# Patient Record
Sex: FEMALE | Race: WHITE | URBAN - METROPOLITAN AREA
[De-identification: names, ages, dates, MRNs, and addresses within clinical notes are randomized per-mention and may not be internally consistent; named-entity substitution may affect disease eponyms.]

---

## 2017-04-18 LAB
BUN SERPL-MCNC: 9 MG/DL (ref 7–18)
CREAT SERPL-MCNC: 0.71 MG/DL (ref 0.6–1.3)
GLUCOSE SERPL-MCNC: 76 MG/DL (ref 74–106)

## 2017-12-18 ENCOUNTER — HISTORICAL (OUTPATIENT)
Dept: LAB | Facility: HOSPITAL | Age: 32
End: 2017-12-18

## 2017-12-18 LAB
ABS NEUT (OLG): 5.93 X10(3)/MCL (ref 2.1–9.2)
B-HCG SERPL QL: NEGATIVE
BASOPHILS # BLD AUTO: 0 X10(3)/MCL (ref 0–0.2)
BASOPHILS NFR BLD AUTO: 0 %
EOSINOPHIL # BLD AUTO: 0.1 X10(3)/MCL (ref 0–0.9)
EOSINOPHIL NFR BLD AUTO: 1 %
ERYTHROCYTE [DISTWIDTH] IN BLOOD BY AUTOMATED COUNT: 11.8 % (ref 11.5–17)
HCT VFR BLD AUTO: 36.9 % (ref 37–47)
HGB BLD-MCNC: 12.3 GM/DL (ref 12–16)
LYMPHOCYTES # BLD AUTO: 2.2 X10(3)/MCL (ref 0.6–4.6)
LYMPHOCYTES NFR BLD AUTO: 24 %
MCH RBC QN AUTO: 30.6 PG (ref 27–31)
MCHC RBC AUTO-ENTMCNC: 33.3 GM/DL (ref 33–36)
MCV RBC AUTO: 91.8 FL (ref 80–94)
MONOCYTES # BLD AUTO: 0.6 X10(3)/MCL (ref 0.1–1.3)
MONOCYTES NFR BLD AUTO: 7 %
NEUTROPHILS # BLD AUTO: 5.93 X10(3)/MCL (ref 2.1–9.2)
NEUTROPHILS NFR BLD AUTO: 67 %
PLATELET # BLD AUTO: 242 X10(3)/MCL (ref 130–400)
PMV BLD AUTO: 10.4 FL (ref 9.4–12.4)
RBC # BLD AUTO: 4.02 X10(6)/MCL (ref 4.2–5.4)
WBC # SPEC AUTO: 8.8 X10(3)/MCL (ref 4.5–11.5)

## 2017-12-20 ENCOUNTER — HISTORICAL (OUTPATIENT)
Dept: ADMINISTRATIVE | Facility: HOSPITAL | Age: 32
End: 2017-12-20

## 2019-09-13 ENCOUNTER — HISTORICAL (OUTPATIENT)
Dept: LAB | Facility: HOSPITAL | Age: 34
End: 2019-09-13

## 2019-09-13 LAB — TSH SERPL-ACNC: 0.8 MIU/ML (ref 0.36–3.74)

## 2020-07-08 ENCOUNTER — HOSPITAL ENCOUNTER (OUTPATIENT)
Dept: MEDSURG UNIT | Facility: HOSPITAL | Age: 35
End: 2020-07-09
Attending: OBSTETRICS & GYNECOLOGY | Admitting: OBSTETRICS & GYNECOLOGY

## 2022-04-10 ENCOUNTER — HISTORICAL (OUTPATIENT)
Dept: ADMINISTRATIVE | Facility: HOSPITAL | Age: 37
End: 2022-04-10

## 2022-04-29 VITALS
SYSTOLIC BLOOD PRESSURE: 107 MMHG | WEIGHT: 154.75 LBS | BODY MASS INDEX: 30.38 KG/M2 | HEIGHT: 60 IN | DIASTOLIC BLOOD PRESSURE: 69 MMHG | OXYGEN SATURATION: 99 %

## 2022-04-29 NOTE — OP NOTE
DATE OF SURGERY:    07/07/2020    SURGEON:  Blake Mo MD    OPERATION PERFORMED:    1. Total laparoscopic hysterectomy.  2. Bilateral salpingo-oophorectomy.    POSTOPERATIVE DIAGNOSES:  The patient is a 34-year-old, white female with menorrhagia and also ovarian cyst, unresponsive to conservative measures.    POSTOPERATIVE DIAGNOSIS:  The patient is a 34-year-old, white female with menorrhagia and also ovarian cyst, unresponsive to conservative measures.    PROCEDURE IN DETAIL:  After proper consents were obtained, the patient was brought to the operating room, placed in the supine position, and underwent general anesthesia and intubation without difficulty.  She was then placed in dorsal lithotomy position, prepped and draped in sterile fashion. The SANDRA manipulator was placed into the cervical canal.  A small incision was then made above the umbilicus and the Veress needle was placed into the abdominal cavity.  Two liters of CO2 were allowed to enter the abdominal cavity.  Trocars were then introduced without difficulty.  The da Alyssa robotic system was then put into place and docked.  Contents of the pelvic cavity examined and the uterus was found to be normal size, shape and position.  Both ovaries had numerous cysts on their surfaces.  At this point in time, the round ligaments were identified bilaterally, clamped, cauterized and cut.  The infundibulopelvic ligaments were isolated, clamped, cauterized and cut.  The bladder was mobilized below the cervix, thus exposing the uterine arteries.  The uterine arteries were then clamped, cauterized and cut bilaterally.  An anterior and posterior colpotomy incision was then made and the ring was demonstrated.  The tissue at the 3 o'clock and 9 o'clock positions were then dissected down to the ring, thus completing the colpotomy.  The specimen was removed transvaginally.  The vaginal cuff was closed with a 2-0 Stratafix suture.  All pedicles were  checked and found to be absolutely dry.  There was no evidence of any visceral injuries.  At this point in time, the CO2 was allowed to escape from abdominal cavity.  Trocars removed without difficulty.  The small incision was closed with 3-0 Vicryl subcuticular stitch.  The patient was carefully     brought out of dorsal lithotomy position and brought to the recovery room in stable fashion tolerating the procedure well.        ______________________________  Blake Mo MD    [facility] [place]/  DD:  07/09/2020  Time:  08:43AM  DT:  07/09/2020  Time:  09:12AM  Job #:  [id number]

## 2022-04-29 NOTE — OP NOTE
DATE OF SURGERY:    12/20/2017    SURGEON:  Blake Mo MD    PROCEDURE:  Dilation and curettage, NovaSure endometrial ablation, laparoscopic tubal cauterization.    PREOPERATIVE DIAGNOSIS:  This is a 32 year old white female with menorrhagia unresponsive to conservative measures.    POSTOPERATIVE DIAGNOSIS:  This is a 32 year old white female with menorrhagia unresponsive to conservative measures.    PROCEDURE IN DETAIL:  After the proper consents were obtained, the patient was brought to the OR, and placed in the supine position, and she underwent general anesthesia intubation without difficulty.  She was then placed in the dorsal lithotomy position, prepped and draped in a sterile fashion.  The speculum was placed in the vaginal vault.  The anterior lip of the cervix was grasped with a single tooth tenaculum.  The uterus was sounded to 9 cm.  The cervix was dilated to a size #8 Hegar.  A sharp curettage was then performed and curettage was continued until there was a gritty consistency felt throughout the cavity.  The tissue was sent to pathology.  The next part of the procedure involved the endometrial ablation which was accomplished with the NovaSure apparatus.  No system malfunctions or complications were encountered.  A small incision was then made above the umbilicus and the Veress needle was placed into the abdominal cavity.  2 liters of CO2 were allowed to enter the abdominal cavity.  Trocars were then introduced without difficulty.  The contents of the pelvic cavity were examined.  The uterus was normal size, shape, and position.  Both adnexa were within normal limits.  The right fallopian tube was identified and traced to the fimbriated end.  It was then cauterized in three adjacent spots.  A like procedure was done on the opposite fallopian tube.  There was no evidence of any bleeding or any visceral injuries.  No evidence of any pelvic pathology.  The CO2 was allowed to escape from the  abdominal cavity.  The trocar was removed without difficulty.  The small incision was closed with a 3-0 Vicryl subcuticular stitch.  The patient was carefully brought out of the dorsal lithotomy position and brought to the Recovery Room in a stable fashion tolerating the procedure well.        ______________________________  Blake Mo MD CEP/BEN  DD:  12/20/2017  Time:  02:56PM  DT:  12/21/2017  Time:  06:41AM  Job #:  [id number]

## 2022-04-29 NOTE — DISCHARGE SUMMARY
DISCHARGE DATE:  07/08/2020    CHIEF COMPLAINT/PERTINENT HISTORY:  Patient is a 34-year-old, white female who was admitted for a TLH.  For pertinent physical findings on admit, please refer to my initial history and physical done on admit.    HOSPITAL COURSE AND TREATMENT:  The patient underwent a TLH, tolerating the procedure well.  Her postoperative course has been essentially unremarkable.    FINAL DIAGNOSIS:  Pelvic pain.    CONDITION ON DISCHARGE:  Subjectively, the patient is without complaints, tolerating a regular diet, ambulating without assistance.  Objectively, her vital signs are stable.  Physical exam essentially normal.    DISPOSITION:  Activity is pelvic rest.  Diet is regular.  Medications are Percocet.  Follow up in my office in 2 weeks for postop check up.        ______________________________  Blake Mo MD    [facility] [place]/  DD:  07/09/2020  Time:  08:45AM  DT:  07/09/2020  Time:  09:03AM  Job #:  [id number]

## 2022-09-17 ENCOUNTER — HISTORICAL (OUTPATIENT)
Dept: ADMINISTRATIVE | Facility: HOSPITAL | Age: 37
End: 2022-09-17

## 2024-04-15 ENCOUNTER — HOSPITAL ENCOUNTER (EMERGENCY)
Facility: HOSPITAL | Age: 39
Discharge: HOME | End: 2024-04-15
Attending: INTERNAL MEDICINE
Payer: COMMERCIAL

## 2024-04-15 VITALS
OXYGEN SATURATION: 97 % | SYSTOLIC BLOOD PRESSURE: 114 MMHG | WEIGHT: 194.38 LBS | TEMPERATURE: 98 F | BODY MASS INDEX: 37.97 KG/M2 | HEART RATE: 72 BPM | RESPIRATION RATE: 18 BRPM | DIASTOLIC BLOOD PRESSURE: 97 MMHG

## 2024-04-15 DIAGNOSIS — R10.13 EPIGASTRIC ABDOMINAL PAIN: ICD-10-CM

## 2024-04-15 DIAGNOSIS — K59.00 CONSTIPATION, UNSPECIFIED CONSTIPATION TYPE: Primary | ICD-10-CM

## 2024-04-15 LAB
ALBUMIN SERPL-MCNC: 3.4 G/DL (ref 3.5–5)
ALBUMIN/GLOB SERPL: 0.8 RATIO (ref 1.1–2)
ALP SERPL-CCNC: 76 UNIT/L (ref 40–150)
ALT SERPL-CCNC: 11 UNIT/L (ref 0–55)
AMYLASE SERPL-CCNC: 41 UNIT/L (ref 25–125)
APPEARANCE UR: CLEAR
AST SERPL-CCNC: 14 UNIT/L (ref 5–34)
BACTERIA #/AREA URNS AUTO: ABNORMAL /HPF
BASOPHILS # BLD AUTO: 0.01 X10(3)/MCL
BASOPHILS NFR BLD AUTO: 0.1 %
BILIRUB SERPL-MCNC: 0.2 MG/DL
BILIRUB UR QL STRIP.AUTO: NEGATIVE
BUN SERPL-MCNC: 14 MG/DL (ref 7–18.7)
CALCIUM SERPL-MCNC: 9.3 MG/DL (ref 8.4–10.2)
CHLORIDE SERPL-SCNC: 106 MMOL/L (ref 98–107)
CO2 SERPL-SCNC: 23 MMOL/L (ref 22–29)
COLOR UR AUTO: YELLOW
CREAT SERPL-MCNC: 0.79 MG/DL (ref 0.55–1.02)
EOSINOPHIL # BLD AUTO: 0.16 X10(3)/MCL (ref 0–0.9)
EOSINOPHIL NFR BLD AUTO: 1.6 %
ERYTHROCYTE [DISTWIDTH] IN BLOOD BY AUTOMATED COUNT: 11.9 % (ref 11.5–17)
GFR SERPLBLD CREATININE-BSD FMLA CKD-EPI: >60 MLS/MIN/1.73/M2
GLOBULIN SER-MCNC: 4.2 GM/DL (ref 2.4–3.5)
GLUCOSE SERPL-MCNC: 98 MG/DL (ref 74–100)
GLUCOSE UR QL STRIP.AUTO: NEGATIVE
HCT VFR BLD AUTO: 37.8 % (ref 37–47)
HGB BLD-MCNC: 12.4 G/DL (ref 12–16)
IMM GRANULOCYTES # BLD AUTO: 0.01 X10(3)/MCL (ref 0–0.04)
IMM GRANULOCYTES NFR BLD AUTO: 0.1 %
KETONES UR QL STRIP.AUTO: NEGATIVE
LACTATE SERPL-SCNC: 1.4 MMOL/L (ref 0.5–2.2)
LEUKOCYTE ESTERASE UR QL STRIP.AUTO: NEGATIVE
LIPASE SERPL-CCNC: 30 U/L
LYMPHOCYTES # BLD AUTO: 2.18 X10(3)/MCL (ref 0.6–4.6)
LYMPHOCYTES NFR BLD AUTO: 21.7 %
MCH RBC QN AUTO: 30.2 PG (ref 27–31)
MCHC RBC AUTO-ENTMCNC: 32.8 G/DL (ref 33–36)
MCV RBC AUTO: 92 FL (ref 80–94)
MONOCYTES # BLD AUTO: 0.48 X10(3)/MCL (ref 0.1–1.3)
MONOCYTES NFR BLD AUTO: 4.8 %
NEUTROPHILS # BLD AUTO: 7.2 X10(3)/MCL (ref 2.1–9.2)
NEUTROPHILS NFR BLD AUTO: 71.7 %
NITRITE UR QL STRIP.AUTO: NEGATIVE
PH UR STRIP.AUTO: 8.5 [PH]
PLATELET # BLD AUTO: 273 X10(3)/MCL (ref 130–400)
PMV BLD AUTO: 9.8 FL (ref 7.4–10.4)
POTASSIUM SERPL-SCNC: 3.7 MMOL/L (ref 3.5–5.1)
PROT SERPL-MCNC: 7.6 GM/DL (ref 6.4–8.3)
PROT UR QL STRIP.AUTO: NEGATIVE
RBC # BLD AUTO: 4.11 X10(6)/MCL (ref 4.2–5.4)
RBC #/AREA URNS AUTO: ABNORMAL /HPF
RBC UR QL AUTO: ABNORMAL
SODIUM SERPL-SCNC: 140 MMOL/L (ref 136–145)
SP GR UR STRIP.AUTO: 1.01 (ref 1–1.03)
SQUAMOUS #/AREA URNS AUTO: ABNORMAL /HPF
UROBILINOGEN UR STRIP-ACNC: 0.2
WBC # SPEC AUTO: 10.04 X10(3)/MCL (ref 4.5–11.5)
WBC #/AREA URNS AUTO: ABNORMAL /HPF

## 2024-04-15 PROCEDURE — 25000003 PHARM REV CODE 250: Performed by: INTERNAL MEDICINE

## 2024-04-15 PROCEDURE — 83605 ASSAY OF LACTIC ACID: CPT | Performed by: INTERNAL MEDICINE

## 2024-04-15 PROCEDURE — 82150 ASSAY OF AMYLASE: CPT | Performed by: INTERNAL MEDICINE

## 2024-04-15 PROCEDURE — 63600175 PHARM REV CODE 636 W HCPCS: Performed by: INTERNAL MEDICINE

## 2024-04-15 PROCEDURE — 85025 COMPLETE CBC W/AUTO DIFF WBC: CPT | Performed by: INTERNAL MEDICINE

## 2024-04-15 PROCEDURE — 96374 THER/PROPH/DIAG INJ IV PUSH: CPT

## 2024-04-15 PROCEDURE — 96361 HYDRATE IV INFUSION ADD-ON: CPT

## 2024-04-15 PROCEDURE — 83690 ASSAY OF LIPASE: CPT | Performed by: INTERNAL MEDICINE

## 2024-04-15 PROCEDURE — 99284 EMERGENCY DEPT VISIT MOD MDM: CPT | Mod: 25

## 2024-04-15 PROCEDURE — 80053 COMPREHEN METABOLIC PANEL: CPT | Performed by: INTERNAL MEDICINE

## 2024-04-15 PROCEDURE — 81001 URINALYSIS AUTO W/SCOPE: CPT | Performed by: INTERNAL MEDICINE

## 2024-04-15 RX ORDER — PROCHLORPERAZINE EDISYLATE 5 MG/ML
10 INJECTION INTRAMUSCULAR; INTRAVENOUS
Status: COMPLETED | OUTPATIENT
Start: 2024-04-15 | End: 2024-04-15

## 2024-04-15 RX ORDER — LACTULOSE 10 G/15ML
20 SOLUTION ORAL
Status: COMPLETED | OUTPATIENT
Start: 2024-04-15 | End: 2024-04-15

## 2024-04-15 RX ORDER — LACTULOSE 10 G/15ML
20 SOLUTION ORAL 3 TIMES DAILY
Qty: 450 ML | Refills: 0 | Status: SHIPPED | OUTPATIENT
Start: 2024-04-15 | End: 2024-04-20

## 2024-04-15 RX ADMIN — LACTULOSE 20 G: 20 SOLUTION ORAL at 04:04

## 2024-04-15 RX ADMIN — PROCHLORPERAZINE EDISYLATE 10 MG: 5 INJECTION INTRAMUSCULAR; INTRAVENOUS at 03:04

## 2024-04-15 RX ADMIN — SODIUM CHLORIDE 1000 ML: 9 INJECTION, SOLUTION INTRAVENOUS at 03:04

## 2024-04-15 NOTE — ED PROVIDER NOTES
Encounter Date: 4/15/2024       History     Chief Complaint   Patient presents with    Abdominal Pain     Began at 1800 yesterday with upper abd and mid back pain. Currently on steroid for URI     38-year-old white female with midepigastric abdominal pain that began at 6:00 p.m. yesterday and now is not letting her sleep and stabbing her in the mid back.  She denies fever chills and denies vomiting but is very nauseated and made herself throw up but it did not give her any relief      Review of patient's allergies indicates:   Allergen Reactions    Coconut oil Anaphylaxis     sob,rash,swelling    Peanut Itching     No past medical history on file.  Past Surgical History:   Procedure Laterality Date    CHOLECYSTECTOMY      HYSTERECTOMY      TONSILLECTOMY       No family history on file.  Social History     Tobacco Use    Smoking status: Every Day     Types: Vaping with nicotine     Review of Systems   Constitutional: Negative.  Negative for activity change, appetite change, chills, diaphoresis, fatigue, fever and unexpected weight change.   HENT: Negative.  Negative for congestion, dental problem, drooling, ear discharge, ear pain, facial swelling, hearing loss, mouth sores, nosebleeds, postnasal drip, rhinorrhea, sinus pressure, sinus pain, sneezing, sore throat, tinnitus, trouble swallowing and voice change.    Eyes: Negative.  Negative for photophobia, pain, discharge, redness, itching and visual disturbance.   Respiratory: Negative.  Negative for apnea, cough, choking, chest tightness, shortness of breath, wheezing and stridor.    Cardiovascular: Negative.  Negative for chest pain, palpitations and leg swelling.   Gastrointestinal:  Positive for abdominal pain and nausea. Negative for abdominal distention, anal bleeding, blood in stool, constipation, diarrhea, rectal pain and vomiting.   Endocrine: Negative.  Negative for cold intolerance, heat intolerance, polydipsia, polyphagia and polyuria.   Genitourinary:  Negative.  Negative for decreased urine volume, difficulty urinating, dyspareunia, dysuria, enuresis, flank pain, frequency, genital sores, hematuria, menstrual problem, pelvic pain, urgency, vaginal bleeding, vaginal discharge and vaginal pain.   Musculoskeletal: Negative.  Negative for arthralgias, back pain, gait problem, joint swelling, myalgias, neck pain and neck stiffness.   Skin: Negative.  Negative for color change, pallor, rash and wound.   Allergic/Immunologic: Negative.  Negative for environmental allergies, food allergies and immunocompromised state.   Neurological: Negative.  Negative for dizziness, tremors, seizures, syncope, facial asymmetry, speech difficulty, weakness, light-headedness, numbness and headaches.   Hematological: Negative.  Negative for adenopathy. Does not bruise/bleed easily.   Psychiatric/Behavioral: Negative.  Negative for agitation, behavioral problems, confusion, decreased concentration, dysphoric mood, hallucinations, self-injury, sleep disturbance and suicidal ideas. The patient is not nervous/anxious and is not hyperactive.    All other systems reviewed and are negative.      Physical Exam     Initial Vitals [04/15/24 0251]   BP Pulse Resp Temp SpO2   (!) 160/117 83 18 98.2 °F (36.8 °C) 99 %      MAP       --         Physical Exam    Nursing note and vitals reviewed.  Constitutional: She appears well-developed and well-nourished. She is not diaphoretic. No distress.   HENT:   Head: Normocephalic and atraumatic.   Mouth/Throat: Oropharynx is clear and moist. No oropharyngeal exudate.   Eyes: Conjunctivae and EOM are normal. Pupils are equal, round, and reactive to light. No scleral icterus.   Neck: Neck supple. No JVD present.   Normal range of motion.  Cardiovascular:  Normal rate, regular rhythm, normal heart sounds and intact distal pulses.     Exam reveals no gallop and no friction rub.       No murmur heard.  Pulmonary/Chest: Breath sounds normal. No respiratory distress.  She has no wheezes. She exhibits no tenderness.   Abdominal: Abdomen is soft. Bowel sounds are normal. She exhibits no distension. There is abdominal tenderness in the epigastric area.     There is no rebound.   Musculoskeletal:         General: Normal range of motion.      Cervical back: Normal range of motion and neck supple.     Neurological: She is alert and oriented to person, place, and time. She has normal strength.   Skin: Skin is warm and dry. Capillary refill takes less than 2 seconds.   Psychiatric: She has a normal mood and affect. Her behavior is normal. Judgment and thought content normal.         ED Course   Procedures  Labs Reviewed   COMPREHENSIVE METABOLIC PANEL - Abnormal; Notable for the following components:       Result Value    Albumin Level 3.4 (*)     Globulin 4.2 (*)     Albumin/Globulin Ratio 0.8 (*)     All other components within normal limits   URINALYSIS, REFLEX TO URINE CULTURE - Abnormal; Notable for the following components:    Blood, UA 1+ (*)     All other components within normal limits   CBC WITH DIFFERENTIAL - Abnormal; Notable for the following components:    RBC 4.11 (*)     MCHC 32.8 (*)     All other components within normal limits   URINALYSIS, MICROSCOPIC - Abnormal; Notable for the following components:    Bacteria, UA Few (*)     Squamous Epithelial Cells, UA Moderate (*)     All other components within normal limits   LACTIC ACID, PLASMA - Normal   AMYLASE - Normal   LIPASE - Normal   CBC W/ AUTO DIFFERENTIAL    Narrative:     The following orders were created for panel order CBC auto differential.  Procedure                               Abnormality         Status                     ---------                               -----------         ------                     CBC with Differential[[id number]]       Abnormal            Final result                 Please view results for these tests on the individual orders.          Imaging Results              X-Ray Abdomen  Flat And Erect (Preliminary result)  Result time 04/15/24 04:37:46      Wet Read by Asher Best MD (04/15/24 04:37:46, [facility] - Emergency Dept, Emergency Medicine)    Stool and gas noted throughout the entire colon consistent with moderate constipation without evidence of perforation or obstruction noted                                     Medications   lactulose 20 gram/30 mL solution Soln 20 g (has no administration in time range)   sodium chloride 0.9% bolus 1,000 mL 1,000 mL (1,000 mLs Intravenous New Bag 4/15/24 0332)   prochlorperazine injection Soln 10 mg (10 mg Intravenous Given 4/15/24 0330)     Medical Decision Making  38-year-old white female presents with stabbing midepigastric abdominal pain that radiates to her back.  Differential diagnosis included pancreatitis, pneumobilia, gastroenteritis, gastritis, gastric ulcer, perforated bowel, diverticulitis, anxiety, constipation.  Workup included x-ray and blood work and urine.  Blood work is relatively benign and x-ray shows a significant amount of stool burden consistent with moderate constipation.  She was given some lactulose for this and for nausea she was given Compazine which greatly relaxer and reduced her pain has pretty much 0.  I discussed the findings with her and her mother and will send in some lactulose for to take for the next few days with copious amounts of water until she cleans her bowel out and recommend high-fiber diet and daily stool softeners    Problems Addressed:  Constipation, unspecified constipation type: acute illness or injury with systemic symptoms  Epigastric abdominal pain: acute illness or injury with systemic symptoms    Amount and/or Complexity of Data Reviewed  Independent Historian: parent  External Data Reviewed: notes.  Labs: ordered. Decision-making details documented in ED Course.  Radiology: ordered and independent interpretation performed. Decision-making details documented in ED  Course.    Risk  OTC drugs.  Prescription drug management.                                      Clinical Impression:  Final diagnoses:  [K59.00] Constipation, unspecified constipation type (Primary)  [R10.13] Epigastric abdominal pain          ED Disposition Condition    Discharge Stable          ED Prescriptions       Medication Sig Dispense Start Date End Date Auth. Provider    lactulose 10 gram/15 ml (CHRONULAC) 10 gram/15 mL (15 mL) solution Take 30 mLs (20 g total) by mouth 3 (three) times daily. Take with plenty of water until loose bowel movement for 5 days 450 mL 4/15/2024 4/20/2024 Asher Best MD          Follow-up Information       Follow up With Specialties Details Why Contact Info    Greg Ford MD Internal Medicine In 3 days  [address]  [address]  [facility] [employer], [place] [address]  [phone]            Christine Seth is a certified MA and was present during the entire interaction with this patient      Asher Best MD  04/15/24 6430

## 2024-04-15 NOTE — Clinical Note
"Nena "PREMA Max was seen and treated in our emergency department on 4/15/2024.     COVID-19 is present in our communities across the state. There is limited testing for COVID at this time, so not all patients can be tested. In this situation, your employee meets the following criteria:    Nena Max has met the criteria for COVID-19 testing based upon symptoms, travel, and/or potential exposure. The test has been completed and is pending results at this time. During this time the employee is not able to work and should be quarantined per the Centers for Disease Control timelines.     If you have any questions or concerns, or if I can be of further assistance, please do not hesitate to contact me.    Sincerely,             Asher Best MD"

## 2025-01-15 ENCOUNTER — HOSPITAL ENCOUNTER (EMERGENCY)
Facility: HOSPITAL | Age: 40
Discharge: HOME | End: 2025-01-15
Attending: INTERNAL MEDICINE
Payer: COMMERCIAL

## 2025-01-15 VITALS
WEIGHT: 190 LBS | DIASTOLIC BLOOD PRESSURE: 99 MMHG | OXYGEN SATURATION: 100 % | HEART RATE: 80 BPM | SYSTOLIC BLOOD PRESSURE: 134 MMHG | RESPIRATION RATE: 16 BRPM | HEIGHT: 60 IN | BODY MASS INDEX: 37.3 KG/M2 | TEMPERATURE: 98 F

## 2025-01-15 DIAGNOSIS — S61.411A LACERATION OF RIGHT HAND WITHOUT FOREIGN BODY, INITIAL ENCOUNTER: Primary | ICD-10-CM

## 2025-01-15 PROCEDURE — 99282 EMERGENCY DEPT VISIT SF MDM: CPT | Mod: 25

## 2025-01-15 PROCEDURE — 12001 RPR S/N/AX/GEN/TRNK 2.5CM/<: CPT

## 2025-01-16 NOTE — ED PROVIDER NOTES
Encounter Date: 1/15/2025       History     Chief Complaint   Patient presents with    Laceration     R hand lac while doing dishes  tonight     39-year-old white female cut her right hand while washing dishes between the knuckles of her 2nd and 3rd digit of the right hand.  She is up-to-date on her tetanus      Review of patient's allergies indicates:   Allergen Reactions    Coconut oil Anaphylaxis     sob,rash,swelling    Peanut Itching     No past medical history on file.  Past Surgical History:   Procedure Laterality Date    CHOLECYSTECTOMY      HYSTERECTOMY      TONSILLECTOMY       No family history on file.  Social History     Tobacco Use    Smoking status: Every Day     Types: Vaping with nicotine     Review of Systems   Constitutional: Negative.  Negative for activity change, appetite change, chills, diaphoresis, fatigue, fever and unexpected weight change.   HENT: Negative.  Negative for congestion, dental problem, drooling, ear discharge, ear pain, facial swelling, hearing loss, mouth sores, nosebleeds, postnasal drip, rhinorrhea, sinus pressure, sinus pain, sneezing, sore throat, tinnitus, trouble swallowing and voice change.    Eyes: Negative.  Negative for photophobia, pain, discharge, redness, itching and visual disturbance.   Respiratory: Negative.  Negative for apnea, cough, choking, chest tightness, shortness of breath, wheezing and stridor.    Cardiovascular: Negative.  Negative for chest pain, palpitations and leg swelling.   Gastrointestinal: Negative.  Negative for abdominal distention, abdominal pain, anal bleeding, blood in stool, constipation, diarrhea, nausea, rectal pain and vomiting.   Endocrine: Negative.  Negative for cold intolerance, heat intolerance, polydipsia, polyphagia and polyuria.   Genitourinary: Negative.  Negative for decreased urine volume, difficulty urinating, dyspareunia, dysuria, enuresis, flank pain, frequency, genital sores, hematuria, menstrual problem, pelvic pain,  urgency, vaginal bleeding, vaginal discharge and vaginal pain.   Musculoskeletal: Negative.  Negative for arthralgias, back pain, gait problem, joint swelling, myalgias, neck pain and neck stiffness.   Skin:  Positive for wound. Negative for color change, pallor and rash.   Allergic/Immunologic: Negative.  Negative for environmental allergies, food allergies and immunocompromised state.   Neurological: Negative.  Negative for dizziness, tremors, seizures, syncope, facial asymmetry, speech difficulty, weakness, light-headedness, numbness and headaches.   Hematological: Negative.  Negative for adenopathy. Does not bruise/bleed easily.   Psychiatric/Behavioral: Negative.  Negative for agitation, behavioral problems, confusion, decreased concentration, dysphoric mood, hallucinations, self-injury, sleep disturbance and suicidal ideas. The patient is not nervous/anxious and is not hyperactive.    All other systems reviewed and are negative.      Physical Exam     Initial Vitals [01/15/25 1848]   BP Pulse Resp Temp SpO2   (!) 134/99 80 16 98 °F (36.7 °C) 100 %      MAP       --         Physical Exam    Nursing note and vitals reviewed.  Constitutional: She appears well-developed and well-nourished.   HENT:   Head: Normocephalic and atraumatic.   Eyes: EOM are normal.   Neck: Neck supple.   Musculoskeletal:         General: Normal range of motion.        Hands:       Cervical back: Neck supple.      Comments: 2 cm superficial laceration on the dorsal part of the right hand at the 2nd and 3rd MCP     Skin: Skin is warm. Capillary refill takes less than 2 seconds.   Psychiatric: She has a normal mood and affect.         ED Course   Lac Repair    Date/Time: 1/15/2025 7:05 PM    Performed by: Asher Best MD  Authorized by: Asher Best MD    Consent:     Consent obtained:  Verbal    Consent given by:  Patient    Risks, benefits, and alternatives were discussed: yes      Risks discussed:  Infection, pain, need  for additional repair, poor cosmetic result, nerve damage, poor wound healing and vascular damage    Alternatives discussed:  No treatment  Universal protocol:     Procedure explained and questions answered to patient or proxy's satisfaction: yes      Required blood products, implants, devices, and special equipment available: no      Site/side marked: no      Immediately prior to procedure, a time out was called: yes      Patient identity confirmed:  Verbally with patient  Anesthesia:     Anesthesia method:  None  Laceration details:     Location:  Hand    Hand location:  R hand, dorsum    Length (cm):  2  Pre-procedure details:     Preparation:  Patient was prepped and draped in usual sterile fashion  Exploration:     Hemostasis achieved with:  Direct pressure    Imaging outcome: foreign body not noted      Wound exploration: wound explored through full range of motion and entire depth of wound visualized      Wound extent: no areolar tissue violation noted and no fascia violation noted      Contaminated: no    Treatment:     Area cleansed with:  Saline    Amount of cleaning:  Extensive    Irrigation solution:  Sterile saline    Irrigation method:  Syringe    Debridement:  None  Skin repair:     Repair method:  Tissue adhesive  Approximation:     Approximation:  Close  Repair type:     Repair type:  Simple  Post-procedure details:     Dressing:  Open (no dressing)    Labs Reviewed - No data to display       Imaging Results    None          Medications - No data to display  Medical Decision Making  39-year-old white female with a superficial laceration of the dorsum of the right hand.  Patient has full range of motion of her fingers bleeding who was stopped with a direct pressure the fascia has not been violated therefore I felt it was safe to closed with tissue adhesive which was done without incident she was sent home                                      Clinical Impression:  Final diagnoses:  [Y45.428F]  "Laceration of right hand without foreign body, initial encounter (Primary)          ED Disposition Condition    Discharge Stable          ED Prescriptions    None       Follow-up Information       Follow up With Specialties Details Why Contact Info    Primary care physician  In 1 week            Portions of this note have been created with voice recognition software. Occasional "wrong-words" or "sound alike" substitutions may have occurred due to inherent limitations of voice software. Please read the note carefully and recognize, using context, word substitutions may have occurred.       Asher Best MD  01/15/25 4227    "